# Patient Record
(demographics unavailable — no encounter records)

---

## 2025-04-01 NOTE — HISTORY OF PRESENT ILLNESS
[Any fall with injury in past year] : Patient reported fall with injury in the past year [NO] : No [0] : 2) Feeling down, depressed, or hopeless: Not at all (0) [PHQ-2 Negative - No further assessment needed] : PHQ-2 Negative - No further assessment needed [I have developed a follow-up plan documented below in the note.] : I have developed a follow-up plan documented below in the note. [FreeTextEntry1] : QUIQUE OHARA is a 68 year yo White  male is coming in today to establish care. Patient has PMHx as listed below   - History of Present Illness: The patient is a 68-year-old male with a history of ulcerative colitis (UC), seronegative rheumatoid arthritis, and onset of macular degeneration. He recently had a steroid injection for his thumb injury, which he acquired from a fall. He has been following his cholesterol since young age, as he had been diagnosed with high cholesterol when he was 11 years old. He has been experiencing regular bowel movements but admits to going to the restroom frequently. He also reports waking up once in the night to urinate and mentions having a 'fair' urinary flow for his age.     #HLD cannot tolerate statin  #Nonobstructive CAD  #Rheumatoid arthritis seronegative  Dr. Sam rheum [facility] Dr. Kailey REYNOLDS for ulcerative colitis Dr. Lynch cardiology Dr. Segovia for eye; has early sign sof macular degeneration  #Early mac degeneration  #Ulcerative colitis on cimzia  dx 10 yrs ago  started smoking marijuana, grows his own. recreationally.   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 if smokes, he doesnt drink Weight loss/malnutrition: gained few pounds in last 6 months : ; 3 kids Immunization: not sure about PCV 20 Screening:  done Depression screening: done Medication reconciliation: done Allergies: done     4M Geriatric Screening Tests   Based on The 4Ms While Caring for Older Adults, an evidence-based focus on the key areas of mentation, mobility, medications, and what matters most (a guide by the [employer] and the [employer])     Medications: -Anticholinergic burden:[ ]     Mobility:   -Chronic pain: no -Constipation:  no -Urinary symptoms: no     Frail Scale: 0/5   -Are you fatigued?no -Can you walk up one flight of stairs?y -Can you walk one block?y -Do you have more than 5 illnesses?no -Have you lost more than 5% of your weight in last 6 months?no       Mentation: -Hx of dementia:no -h/o delirium:no -Cognitive enhancing agents:no       Sleep: can sleep 6-8 hrs     Matters Most     [TextBox_25] : due [Mammogramdate] : 2023 [TextBox_19] : not sure when is next one [FreeTextEntry2] : see derm. brother  of melanoma [[id number]] : 0

## 2025-04-28 NOTE — HISTORY OF PRESENT ILLNESS
[FreeTextEntry1] : Mr. Smiley is a new patient to me today previously followed by Dr. Chavez for CAD

## 2025-04-28 NOTE — CARDIOLOGY SUMMARY
[de-identified] : Stress mibi to 5/10/2022. Normal. Normal wall motion. No ischemia. EF 61%. 4 minutes. Isreal stage II. 89%. Nonspecific upsloping ST depression. No symptom [de-identified] :  12/29/17. Normal LV function. EF 65%. Mild aortic sclerosis. Insignificant gradient, 8/5. LA 4.1. [de-identified] : Calcium score 8/1/16. 271. LM 24/RCA to 39/LAD 8/LCx 0. 50 to 75th percentile

## 2025-05-01 NOTE — CARDIOLOGY SUMMARY
[de-identified] : Stress mibi to 5/10/2022. Normal. Normal wall motion. No ischemia. EF 61%. 4 minutes. Isreal stage II. 89%. Nonspecific upsloping ST depression. No symptom [de-identified] :  12/29/17. Normal LV function. EF 65%. Mild aortic sclerosis. Insignificant gradient, 8/5. LA 4.1. [de-identified] : Calcium score 8/1/16. 271. LM 24/RCA to 39/LAD 8/LCx 0. 50 to 75th percentile

## 2025-05-01 NOTE — HISTORY OF PRESENT ILLNESS
[FreeTextEntry1] : Mr. Smiley is a new patient to me today previously followed by Dr. Chavez for CAD  There have been no hospitalizations since last visit. He states he stopped his xanax cold turkey, now takes melatonin, cannabis. He denies chest pain, dyspnea, palpitations or syncope. He has bendopnea. He exercises 5 days a week, 30 minutes on the Stairmaster, 3 times a week weights 3 days a week, stretch, and other cardio.

## 2025-05-12 NOTE — REVIEW OF SYSTEMS
[Negative] : Heme/Lymph [Shortness Of Breath] : no shortness of breath [Wheezing] : no wheezing [Cough] : no cough [SOB on Exertion] : no shortness of breath during exertion [FreeTextEntry4] : congested

## 2025-05-12 NOTE — HISTORY OF PRESENT ILLNESS
[FreeTextEntry1] : exhaustion, fever  since 5/8/2025 sleeping a lot, appetite decreased took dayquil on 5/8 which helped taking NyQuil every night taking tylenol 1000 every 4-6 hrs maximum 3g/d  normally he takes claritin for seasonal allergies but has stopped  while he is sick  states he tested negative for flu and covid on 5/9  no n/v/d no urinary changes or concerns

## 2025-05-12 NOTE — PHYSICAL EXAM
[Alert] : alert [No Acute Distress] : in no acute distress [Normal Outer Ear/Nose] : the ears and nose were normal in appearance [Normal Appearance] : the appearance of the neck was normal [Supple] : the neck was supple [No Respiratory Distress] : no respiratory distress [No Acc Muscle Use] : no accessory muscle use [Respiration, Rhythm And Depth] : normal respiratory rhythm and effort [Auscultation Breath Sounds / Voice Sounds] : lungs were clear to auscultation bilaterally [Heart Rate And Rhythm] : heart rate was normal and rhythm regular [Bowel Sounds] : normal bowel sounds [Abdomen Tenderness] : non-tender [Abdomen Soft] : soft [No Spinal Tenderness] : no spinal tenderness [Normal Color / Pigmentation] : normal skin color and pigmentation [Normal Turgor] : normal skin turgor [No Focal Deficits] : no focal deficits [Normal Affect] : the affect was normal [Normal Mood] : the mood was normal [Normal Gait] : normal gait [Motor Exam] : the motor exam was normal [Oriented To Time, Place, And Person] : oriented to person, place, and time [de-identified] : occasional extra beat